# Patient Record
Sex: MALE | Race: WHITE | Employment: FULL TIME | URBAN - METROPOLITAN AREA
[De-identification: names, ages, dates, MRNs, and addresses within clinical notes are randomized per-mention and may not be internally consistent; named-entity substitution may affect disease eponyms.]

---

## 2017-09-13 ENCOUNTER — OFFICE VISIT (OUTPATIENT)
Dept: FAMILY MEDICINE | Facility: CLINIC | Age: 32
End: 2017-09-13
Payer: COMMERCIAL

## 2017-09-13 VITALS
OXYGEN SATURATION: 97 % | BODY MASS INDEX: 25.71 KG/M2 | HEIGHT: 72 IN | WEIGHT: 189.8 LBS | DIASTOLIC BLOOD PRESSURE: 74 MMHG | HEART RATE: 74 BPM | SYSTOLIC BLOOD PRESSURE: 114 MMHG | TEMPERATURE: 98.1 F

## 2017-09-13 DIAGNOSIS — Z28.21 INFLUENZA VACCINATION DECLINED BY PATIENT: ICD-10-CM

## 2017-09-13 DIAGNOSIS — E66.3 OVERWEIGHT (BMI 25.0-29.9): ICD-10-CM

## 2017-09-13 DIAGNOSIS — J01.00 ACUTE MAXILLARY SINUSITIS, RECURRENCE NOT SPECIFIED: Primary | ICD-10-CM

## 2017-09-13 PROCEDURE — 99213 OFFICE O/P EST LOW 20 MIN: CPT | Performed by: NURSE PRACTITIONER

## 2017-09-13 RX ORDER — BENZONATATE 200 MG/1
200 CAPSULE ORAL 3 TIMES DAILY PRN
Qty: 21 CAPSULE | Refills: 0 | Status: SHIPPED | OUTPATIENT
Start: 2017-09-13 | End: 2018-02-19

## 2017-09-13 NOTE — PATIENT INSTRUCTIONS
Based on your medical history and these are the current health maintenance or preventive care services that you are due for (some may have been done at this visit)  Health Maintenance Due   Topic Date Due     EYE EXAM Q1 YEAR  04/28/2017     INFLUENZA VACCINE (SYSTEM ASSIGNED)  09/01/2017         At [facility], we strive to deliver an exceptional experience to you, every time we see you.    If you receive a survey in the mail, please send us back your thoughts. We really do value your feedback.    Your care team's suggested websites for health information:  Www.[facility].org : Up to date and easily searchable information on multiple topics.  Www.medlineplus.gov : medication info, interactive tutorials, watch real surgeries online  Www.familydoctor.org : good info from the Academy of Family Physicians  Www.cdc.gov : public health info, travel advisories, epidemics (H1N1)  Www.aap.org : children's health info, normal development, vaccinations  Www.health.[facility].[place].us : [place] dept of health, public health issues in [place], N1N1    How to contact your care team:   Team Cherelle/Josh [phone]         Pharmacy [phone]    Dr. Luna, Mallika Vásquez PA-C, Dr. Hatch, Dorothy VAZQUEZ CNP, Catherine Kaba PA-C, Dr. Valerio, and GEORGE Batista CNP    Team RNs: Suzy & Fiorella      Clinic hours  M-Th 7 am-7 pm   Fri 7 am-5 pm.   Urgent care M-F 11 am-9 pm,   Sat/Sun 9 am-5 pm.  Pharmacy M-Th 8 am-8 pm Fri 8 am-6 pm  Sat/Sun 9 am-5 pm.     All password changes, disabled accounts, or ID changes in [employer] will be done by our Access Services Department.    If you need help with your account or password, call: [phone]. Clinic staff no longer has the ability to change passwords.     Sinusitis (No Antibiotics)    The sinuses are air-filled spaces within the bones of the face. They connect to the inside of the nose. Sinusitis is an inflammation of the tissue lining the sinus cavity.  Sinus inflammation can occur during a cold. It can also be due to allergies to pollens and other particles in the air. It can cause symptoms such as sinus congestion, headache, sore throat, facial swelling and fullness. It may also cause a low-grade fever. No infection is present, and no antibiotic treatment is needed.  Home care    Drink plenty of water, hot tea, and other liquids. This may help thin mucus. It also may promote sinus drainage.    Heat may help soothe painful areas of the face. Use a towel soaked in hot water. Or,  the shower and direct the hot spray onto your face. Using a vaporizer along with a menthol rub at night may also help.     An expectorant containing guaifenesin may help thin the mucus and promote drainage from the sinuses.    Over-the-counter decongestants may be used unless a similar medicine was prescribed. Nasal sprays work the fastest. Use one that contains phenylephrine or oxymetazoline. First blow the nose gently. Then use the spray. Do not use these medicines more often than directed on the label or symptoms may get worse. You may also use tablets containing pseudoephedrine. Avoid products that combine ingredients, because side effects may be increased. Read labels. You can also ask the pharmacist for help. (NOTE: Persons with high blood pressure should not use decongestants. They can raise blood pressure.)    Over-the-counter antihistamines may help if allergies contributed to your sinusitis.      Use acetaminophen or ibuprofen to control pain, unless another pain medicine was prescribed. (If you have chronic liver or kidney disease or ever had a stomach ulcer, talk with your doctor before using these medicines. Aspirin should never be used in anyone under 18 years of age who is ill with a fever. It may cause severe liver damage.)    Use nasal rinses or irrigation as instructed by your health care provider.    Don't smoke. This can worsen symptoms.  Follow-up care  Follow  up with your healthcare provider or our staff if you are not improving within the next week.  When to seek medical advice  Call your healthcare provider if any of these occur:    Green or yellow discharge from the nose or into the throat    Facial pain or headache becoming more severe    Stiff neck    Unusual drowsiness or confusion    Swelling of the forehead or eyelids    Vision problems, including blurred or double vision    Fever of 100.4 F (38 C) or higher, or as directed by your healthcare provider    Seizure    Breathing problems    Symptoms not resolving within 10 days  Date Last Reviewed: 4/13/2015 2000-2017 [employer]. [address] [place], [place] [address]. All rights reserved. This information is not intended as a substitute for professional medical care. Always follow your healthcare professional's instructions.

## 2017-09-13 NOTE — NURSING NOTE
"Chief Complaint   Patient presents with     URI       Initial /74 (BP Location: Left arm, Patient Position: Sitting, Cuff Size: Adult Regular)  Pulse 74  Temp 98.1  F (36.7  C) (Oral)  Ht 5' 11.65\" (1.82 m)  Wt 189 lb 12.8 oz (86.1 kg)  SpO2 97%  BMI 25.99 kg/m2 Estimated body mass index is 25.99 kg/(m^2) as calculated from the following:    Height as of this encounter: 5' 11.65\" (1.82 m).    Weight as of this encounter: 189 lb 12.8 oz (86.1 kg).  Medication Reconciliation: complete   Carlotta Khan MA      "

## 2017-09-13 NOTE — PROGRESS NOTES
SUBJECTIVE:   Catarino Leon is a 31 year old male who presents to clinic today for the following health issues:      ENT Symptoms             Symptoms: cc Present Absent Comment   Fever/Chills   x    Fatigue   x    Muscle Aches   x    Eye Irritation   x    Sneezing   x    Nasal Van/Drg  x     Sinus Pressure/Pain  x     Loss of smell  x     Dental pain   x    Sore Throat  x  Started with sore throat for 1 day    Swollen Glands  x     Ear Pain/Fullness   x    Cough  x     Wheeze  x     Chest Pain  x     Shortness of breath  x     Rash   x    Other         Symptom duration:  1 day, sore throat started Monday    Symptom severity:  Mild   Treatments tried:  Nitequil   Contacts:  Yes- friend was sick couple weeks ago      No history of asthma or allergies.      Problem list and histories reviewed & adjusted, as indicated.  Additional history: as documented    Patient Active Problem List   Diagnosis     CARDIOVASCULAR SCREENING; LDL GOAL LESS THAN 160     Sebaceous cyst     History of kidney stones     Overweight (BMI 25.0-29.9)     Past Surgical History:   Procedure Laterality Date     FACIAL RECONSTRUCTION SURGERY      multiple surgeries following a trauma       Social History   Substance Use Topics     Smoking status: Never Smoker     Smokeless tobacco: Never Used     Alcohol use Yes      Comment: occasionally     Family History   Problem Relation Age of Onset     HEART DISEASE Maternal Grandfather      HEART DISEASE Paternal Grandfather      Nephrolithiasis Sister      C.A.D. No family hx of      DIABETES No family hx of      Hypertension No family hx of      CANCER No family hx of      CEREBROVASCULAR DISEASE No family hx of      Thyroid Disease No family hx of      Glaucoma No family hx of      Macular Degeneration No family hx of          BP Readings from Last 3 Encounters:   09/13/17 114/74   10/10/16 114/67   05/19/16 125/66    Wt Readings from Last 3 Encounters:   09/13/17 189 lb 12.8 oz (86.1 kg)  "  10/10/16 191 lb (86.6 kg)   06/09/15 178 lb (80.7 kg)                  Labs reviewed in EPIC        Reviewed and updated as needed this visit by clinical staff       Reviewed and updated as needed this visit by Provider         ROS:  Constitutional, HEENT, cardiovascular, pulmonary, gi and gu systems are negative, except as otherwise noted.      OBJECTIVE:   /74 (BP Location: Left arm, Patient Position: Sitting, Cuff Size: Adult Regular)  Pulse 74  Temp 98.1  F (36.7  C) (Oral)  Ht 5' 11.65\" (1.82 m)  Wt 189 lb 12.8 oz (86.1 kg)  SpO2 97%  BMI 25.99 kg/m2  Body mass index is 25.99 kg/(m^2).  GENERAL: healthy, alert and no distress  EYES: Eyes grossly normal to inspection, PERRL and conjunctivae and sclerae normal  HENT: ear canals and TM's normal, nose and mouth without ulcers or lesions, + maxillary sinus TTP bilaterally  NECK: no adenopathy, no asymmetry, masses, or scars and thyroid normal to palpation  RESP: lungs clear to auscultation - no rales, rhonchi or wheezes  CV: regular rate and rhythm, normal S1 S2, no S3 or S4, no murmur, click or rub, no peripheral edema and peripheral pulses strong  ABDOMEN: soft, nontender, no hepatosplenomegaly, no masses and bowel sounds normal  MS: no gross musculoskeletal defects noted, no edema  SKIN: no suspicious lesions or rashes  BACK: no CVA tenderness, no paralumbar tenderness  PSYCH: mentation appears normal, affect normal/bright  LYMPH: normal ant/post cervical, supraclavicular nodes    Diagnostic Test Results:  none     ASSESSMENT/PLAN:         BMI:   Estimated body mass index is 25.99 kg/(m^2) as calculated from the following:    Height as of this encounter: 5' 11.65\" (1.82 m).    Weight as of this encounter: 189 lb 12.8 oz (86.1 kg).   Weight management plan: Discussed healthy diet and exercise guidelines and patient will follow up in 12 months in clinic to re-evaluate.        1. Acute maxillary sinusitis, recurrence not specified       Antibiotics not " indicated at this time      We discussed the pathophysiology of sinus pressure/sinusitis and treatment options with emphasis on healthy lifestyle and opening up natural drainage passages.   We discussed that in only 0.5% to 2% of the time, sinusitis is complicated by a bacterial infection requiring antibiotics.   Of those patients who have bacterial sinusitis, 40-60% will clear the infection on their own spontaneously.  I discussed the risks and benefits of various over the counter and prescription treatments including short courses of decongestant nasal sprays.  Symptoms will typically resolve after 7-10 days. Catarino Leon is to continue to push fluids, saline lavage, OTC Tylenol or Ibuprofen for headache/sinus pain and return to clinic iIf new, worsening or persistent symptoms.    - benzonatate (TESSALON) 200 MG capsule; Take 1 capsule (200 mg) by mouth 3 times daily as needed for cough  Dispense: 21 capsule; Refill: 0    2. Overweight (BMI 25.0-29.9)  Benefits of weight loss reviewed in detail, encouraged him to cut back on the carbohydrates in the diet, consume more fruits and vegetables, drink plenty of water, avoid fruit juices, sodas, get 150 min moderate exercise/week.  Recheck weight in 6 months.      3. Influenza vaccination declined by patient  Patient will get vaccine from his employer      See Patient Instructions    GEORGE Caceres [facility]

## 2018-02-19 ENCOUNTER — OFFICE VISIT (OUTPATIENT)
Dept: FAMILY MEDICINE | Facility: CLINIC | Age: 33
End: 2018-02-19
Payer: COMMERCIAL

## 2018-02-19 VITALS
BODY MASS INDEX: 25.2 KG/M2 | HEART RATE: 82 BPM | TEMPERATURE: 98.5 F | OXYGEN SATURATION: 97 % | DIASTOLIC BLOOD PRESSURE: 76 MMHG | SYSTOLIC BLOOD PRESSURE: 125 MMHG | WEIGHT: 184 LBS | RESPIRATION RATE: 14 BRPM

## 2018-02-19 DIAGNOSIS — J01.00 ACUTE MAXILLARY SINUSITIS, RECURRENCE NOT SPECIFIED: Primary | ICD-10-CM

## 2018-02-19 LAB
DEPRECATED S PYO AG THROAT QL EIA: NORMAL
FLUAV+FLUBV AG SPEC QL: NEGATIVE
FLUAV+FLUBV AG SPEC QL: NEGATIVE
SPECIMEN SOURCE: NORMAL
SPECIMEN SOURCE: NORMAL

## 2018-02-19 PROCEDURE — 87880 STREP A ASSAY W/OPTIC: CPT | Performed by: NURSE PRACTITIONER

## 2018-02-19 PROCEDURE — 99213 OFFICE O/P EST LOW 20 MIN: CPT | Performed by: NURSE PRACTITIONER

## 2018-02-19 PROCEDURE — 87804 INFLUENZA ASSAY W/OPTIC: CPT | Performed by: NURSE PRACTITIONER

## 2018-02-19 PROCEDURE — 87081 CULTURE SCREEN ONLY: CPT | Performed by: NURSE PRACTITIONER

## 2018-02-19 ASSESSMENT — PAIN SCALES - GENERAL: PAINLEVEL: NO PAIN (0)

## 2018-02-19 NOTE — PROGRESS NOTES
SUBJECTIVE:   Catarino Leon is a 32 year old male who presents to clinic today for the following health issues:    Acute Illness   Acute illness concerns:  URI  Onset: 3 days     Fever: YES    Chills/Sweats: YES    Headache (location?): YES    Sinus Pressure:YES    Conjunctivitis:  no    Ear Pain: no    Rhinorrhea: YES    Congestion: YES    Sore Throat: no      Cough: YES-productive of yellow sputum    Wheeze: YES    Decreased Appetite: YES    Nausea: no    Vomiting: no    Diarrhea:  no    Dysuria/Freq.: no    Fatigue/Achiness: YES    Sick/Strep Exposure: YES     Therapies Tried and outcome: Dayquill     32 year old male presents with the above concerns. Worst symptoms are head congestion. Started in chest 1 week ago and moved to head 3 days ago. Very tired. Feverish last night and this morning. Family sick but they're improving. Taking Dayquil and Nyquil. Feels like sinus infection. No chest pain, shortness of breath. No nausea, vomiting. Feverish last day or so - hasn't checked temp.    Problem list and histories reviewed & adjusted, as indicated.  Additional history: as documented    Patient Active Problem List   Diagnosis     CARDIOVASCULAR SCREENING; LDL GOAL LESS THAN 160     Sebaceous cyst     History of kidney stones     Overweight (BMI 25.0-29.9)     Past Surgical History:   Procedure Laterality Date     FACIAL RECONSTRUCTION SURGERY      multiple surgeries following a trauma       Social History   Substance Use Topics     Smoking status: Never Smoker     Smokeless tobacco: Never Used     Alcohol use Yes      Comment: occasionally     Family History   Problem Relation Age of Onset     HEART DISEASE Maternal Grandfather      HEART DISEASE Paternal Grandfather      Nephrolithiasis Sister      C.A.D. No family hx of      DIABETES No family hx of      Hypertension No family hx of      CANCER No family hx of      CEREBROVASCULAR DISEASE No family hx of      Thyroid Disease No family hx of      Glaucoma  No family hx of      Macular Degeneration No family hx of          Current Outpatient Prescriptions   Medication Sig Dispense Refill     amoxicillin-clavulanate (AUGMENTIN) 875-125 MG per tablet Take 1 tablet by mouth 2 times daily 20 tablet 0     No Known Allergies    Reviewed and updated as needed this visit by clinical staff  Tobacco  Allergies  Meds  Problems       Reviewed and updated as needed this visit by Provider  Allergies  Meds  Problems         ROS:  Constitutional, HEENT, cardiovascular, pulmonary, gi and gu systems are negative, except as otherwise noted.    OBJECTIVE:     /76 (BP Location: Left arm, Patient Position: Chair, Cuff Size: Adult Large)  Pulse 82  Temp 98.5  F (36.9  C) (Oral)  Resp 14  Wt 184 lb (83.5 kg)  SpO2 97%  BMI 25.2 kg/m2  Body mass index is 25.2 kg/(m^2).  GENERAL: healthy, alert and no distress. Appears tired.  EYES: Eyes grossly normal to inspection, PERRL and conjunctivae and sclerae normal  HENT: ear canals and TM's normal, nose and mouth without ulcers or lesions. Fluid behind both TMs -  No erythema.  NECK: no adenopathy, no asymmetry, masses, or scars and thyroid normal to palpation  RESP: lungs clear to auscultation - no rales, rhonchi or wheezes  CV: regular rate and rhythm, normal S1 S2, no S3 or S4, no murmur, click or rub, no peripheral edema and peripheral pulses strong  MS: no gross musculoskeletal defects noted, no edema  PSYCH: mentation appears normal, affect normal/bright    Diagnostic Test Results:  Results for orders placed or performed in visit on 02/19/18 (from the past 24 hour(s))   Strep, Rapid Screen   Result Value Ref Range    Specimen Description Throat     Rapid Strep A Screen       NEGATIVE: No Group A streptococcal antigen detected by immunoassay, await culture report.   Influenza A/B antigen   Result Value Ref Range    Influenza A/B Agn Specimen Nasal     Influenza A Negative NEG^Negative    Influenza B Negative NEG^Negative        ASSESSMENT/PLAN:     1. Acute maxillary sinusitis, recurrence not specified  Negative influenza and strep. Explained this still could be viral but given the course of better then worse, will start Augmentin.  - Influenza A/B antigen  - Strep, Rapid Screen  - Beta strep group A culture  - amoxicillin-clavulanate (AUGMENTIN) 875-125 MG per tablet; Take 1 tablet by mouth 2 times daily  Dispense: 20 tablet; Refill: 0  Push fluids, rest  Saline nasal irrigation or can try a intranasal decongestant such as Afrin but not more than three days. Can also try Flonase 2 sprays each nostril daily x2 weeks   Can try Mucinex DM for chest congestion  Start antibiotic today  Tylenol/ibuprofen as needed for pain/fever  If worsening or not improving, follow up with primary care provider in 5 days, sooner if needed  See Patient Instructions    Patient verbalizes understanding and agrees with plan of care. Patient stable for discharge.      GEORGE Oseguera CNP  [facility]

## 2018-02-19 NOTE — MR AVS SNAPSHOT
After Visit Summary   2/19/2018    Catarino Leon    MRN: [id number]           Patient Information     Date Of Birth          1985        Visit Information        Provider Department      2/19/2018 12:00 PM Celia Garner APRN [facility]        Today's Diagnoses     Acute maxillary sinusitis, recurrence not specified    -  1      Care Instructions    Push fluids, rest  Saline nasal irrigation or can try a intranasal decongestant such as Afrin but not more than three days. Can also try Flonase 2 sprays each nostril daily x2 weeks   Can try Mucinex DM for chest congestion  Start antibiotic today  Tylenol/ibuprofen as needed for pain/fever  If worsening or not improving, follow up with primary care provider in 5 days, sooner if needed      Acute Sinusitis    Acute sinusitis is irritation and swelling of the sinuses. It is usually caused by a viral infection after a common cold. Your doctor can help you find relief.  What is acute sinusitis?  Sinuses are air-filled spaces in the skull behind the face. They are kept moist and clean by a lining of mucosa. Things such as pollen, smoke, and chemical fumes can irritate the mucosa. It can then swell up. As a response to irritation, the mucosa makes more mucus and other fluids. Tiny hairlike cilia cover the mucosa. Cilia help carry mucus toward the opening of the sinus. Too much mucus may cause the cilia to stop working. This blocks the sinus opening. A buildup of fluid in the sinuses then causes pain and pressure. It can also encourage bacteria to grow in the sinuses.  Common symptoms of acute sinusitis  You may have:    Facial soreness pain    Headache    Fever    Fluid draining in the back of the throat (postnasal drip)    Congestion    Drainage that is thick and colored, instead of clear    Cough  Diagnosing acute sinusitis  Your doctor will ask about your symptoms and health history. He or she will look at your ear,  nose, and throat. You usually won't need to have X-rays taken.    The doctor may take a sample of mucus to check for bacteria. If you have sinusitis that keeps coming back, you may need imaging tests such as X-rays or CAT scans. This will help your doctor check for a structural problem that may be causing the infection.  Treating acute sinusitis  Treatment is aimed at unblocking the sinus opening and helping the cilia work again. You may need to take antihistamine and decongestant medicine. These can reduce inflammation and decrease the amount of fluid your sinuses make. If you have a bacterial infection, you will need to take antibiotic medicine for 10 to 14 days. Take this medicine until it is gone, even if you feel better.  Date Last Reviewed: 10/1/2016    0187-0011 [employer]. [address] [place] [address], [place] [id number]. All rights reserved. This information is not intended as a substitute for professional medical care. Always follow your healthcare professional's instructions.                Follow-ups after your visit        Who to contact     If you have questions or need follow up information about today's clinic visit or your schedule please contact [facility] directly at [phone].  Normal or non-critical lab and imaging results will be communicated to you by MyChart, letter or phone within 4 business days after the clinic has received the results. If you do not hear from us within 7 days, please contact the clinic through [facility] or phone. If you have a critical or abnormal lab result, we will notify you by phone as soon as possible.  Submit refill requests through [facility] or call your pharmacy and they will forward the refill request to us. Please allow 3 business days for your refill to be completed.          Additional Information About Your Visit        [facility] Information     [facility] lets you send messages to your doctor, view your test results, renew your  "prescriptions, schedule appointments and more. To sign up, go to [email or web address] . Click on \"Log in\" on the left side of the screen, which will take you to the Welcome page. Then click on \"Sign up Now\" on the right side of the page.     You will be asked to enter the access code listed below, as well as some personal information. Please follow the directions to create your username and password.     Your access code is: [email or web address]  Expires: 2018  1:36 PM     Your access code will  in 90 days. If you need help or a new code, please call your [facility] clinic or [phone].        Care EveryWhere ID     This is your Care EveryWhere ID. This could be used by other organizations to access your [facility] medical records  [id number]        Your Vitals Were     Pulse Temperature Respirations Pulse Oximetry BMI (Body Mass Index)       82 98.5  F (36.9  C) (Oral) 14 97% 25.2 kg/m2        Blood Pressure from Last 3 Encounters:   18 125/76   17 114/74   10/10/16 114/67    Weight from Last 3 Encounters:   18 184 lb (83.5 kg)   17 189 lb 12.8 oz (86.1 kg)   10/10/16 191 lb (86.6 kg)              We Performed the Following     Beta strep group A culture     Influenza A/B antigen     Strep, Rapid Screen          Today's Medication Changes          These changes are accurate as of 18  1:36 PM.  If you have any questions, ask your nurse or doctor.               Start taking these medicines.        Dose/Directions    amoxicillin-clavulanate 875-125 MG per tablet   Commonly known as:  AUGMENTIN   Used for:  Acute maxillary sinusitis, recurrence not specified   Started by:  Celia Garner APRN CNP        Dose:  1 tablet   Take 1 tablet by mouth 2 times daily   Quantity:  20 tablet   Refills:  0            Where to get your medicines      These medications were sent to [facility] [place] - [address]  [address], [place] [address] "     Phone:  [phone]     amoxicillin-clavulanate 875-125 MG per tablet                Primary Care Provider Office Phone # Fax #    [facility] [phone]       [address]  [facility] [address]        Equal Access to Services     SULEMAN DEWITT : Hadii aad ku hadasho Soomaali, waaxda luqadaha, qaybta kaalmada adeegyada, waxay idiin hayaan adeeg khraulitosh laanisa alfonso. So [facility] [phone].    ATENCIÓN: Si habla español, tiene a castillo disposición servicios gratuitos de asistencia lingüística. Llame al [phone].    We comply with applicable federal civil rights laws and [place] laws. We do not discriminate on the basis of race, color, national origin, age, disability, sex, sexual orientation, or gender identity.            Thank you!     Thank you for choosing [facility]  for your care. Our goal is always to provide you with excellent care. Hearing back from our patients is one way we can continue to improve our services. Please take a few minutes to complete the written survey that you may receive in the mail after your visit with us. Thank you!             Your Updated Medication List - Protect others around you: Learn how to safely use, store and throw away your medicines at www.disposemymeds.org.          This list is accurate as of 2/19/18  1:36 PM.  Always use your most recent med list.                   Brand Name Dispense Instructions for use Diagnosis    amoxicillin-clavulanate 875-125 MG per tablet    AUGMENTIN    20 tablet    Take 1 tablet by mouth 2 times daily    Acute maxillary sinusitis, recurrence not specified

## 2018-02-19 NOTE — PATIENT INSTRUCTIONS
Push fluids, rest  Saline nasal irrigation or can try a intranasal decongestant such as Afrin but not more than three days. Can also try Flonase 2 sprays each nostril daily x2 weeks   Can try Mucinex DM for chest congestion  Start antibiotic today  Tylenol/ibuprofen as needed for pain/fever  If worsening or not improving, follow up with primary care provider in 5 days, sooner if needed      Acute Sinusitis    Acute sinusitis is irritation and swelling of the sinuses. It is usually caused by a viral infection after a common cold. Your doctor can help you find relief.  What is acute sinusitis?  Sinuses are air-filled spaces in the skull behind the face. They are kept moist and clean by a lining of mucosa. Things such as pollen, smoke, and chemical fumes can irritate the mucosa. It can then swell up. As a response to irritation, the mucosa makes more mucus and other fluids. Tiny hairlike cilia cover the mucosa. Cilia help carry mucus toward the opening of the sinus. Too much mucus may cause the cilia to stop working. This blocks the sinus opening. A buildup of fluid in the sinuses then causes pain and pressure. It can also encourage bacteria to grow in the sinuses.  Common symptoms of acute sinusitis  You may have:    Facial soreness pain    Headache    Fever    Fluid draining in the back of the throat (postnasal drip)    Congestion    Drainage that is thick and colored, instead of clear    Cough  Diagnosing acute sinusitis  Your doctor will ask about your symptoms and health history. He or she will look at your ear, nose, and throat. You usually won't need to have X-rays taken.    The doctor may take a sample of mucus to check for bacteria. If you have sinusitis that keeps coming back, you may need imaging tests such as X-rays or CAT scans. This will help your doctor check for a structural problem that may be causing the infection.  Treating acute sinusitis  Treatment is aimed at unblocking the sinus opening and  helping the cilia work again. You may need to take antihistamine and decongestant medicine. These can reduce inflammation and decrease the amount of fluid your sinuses make. If you have a bacterial infection, you will need to take antibiotic medicine for 10 to 14 days. Take this medicine until it is gone, even if you feel better.  Date Last Reviewed: 10/1/2016    2407-5335 [employer]. [address] [place] [address], [place] [address]. All rights reserved. This information is not intended as a substitute for professional medical care. Always follow your healthcare professional's instructions.

## 2018-02-20 LAB
BACTERIA SPEC CULT: NORMAL
SPECIMEN SOURCE: NORMAL

## 2018-06-26 ENCOUNTER — RADIANT APPOINTMENT (OUTPATIENT)
Dept: GENERAL RADIOLOGY | Facility: CLINIC | Age: 33
End: 2018-06-26
Attending: FAMILY MEDICINE
Payer: COMMERCIAL

## 2018-06-26 ENCOUNTER — OFFICE VISIT (OUTPATIENT)
Dept: FAMILY MEDICINE | Facility: CLINIC | Age: 33
End: 2018-06-26
Payer: COMMERCIAL

## 2018-06-26 VITALS
DIASTOLIC BLOOD PRESSURE: 64 MMHG | RESPIRATION RATE: 18 BRPM | BODY MASS INDEX: 25.6 KG/M2 | HEIGHT: 72 IN | SYSTOLIC BLOOD PRESSURE: 138 MMHG | WEIGHT: 189 LBS | OXYGEN SATURATION: 97 % | HEART RATE: 60 BPM | TEMPERATURE: 98.4 F

## 2018-06-26 DIAGNOSIS — S60.132A CONTUSION OF LEFT MIDDLE FINGER WITH DAMAGE TO NAIL, INITIAL ENCOUNTER: ICD-10-CM

## 2018-06-26 DIAGNOSIS — S60.132A CONTUSION OF LEFT MIDDLE FINGER WITH DAMAGE TO NAIL, INITIAL ENCOUNTER: Primary | ICD-10-CM

## 2018-06-26 PROCEDURE — 73140 X-RAY EXAM OF FINGER(S): CPT | Mod: LT

## 2018-06-26 PROCEDURE — 99213 OFFICE O/P EST LOW 20 MIN: CPT | Performed by: FAMILY MEDICINE

## 2018-06-26 ASSESSMENT — PAIN SCALES - GENERAL: PAINLEVEL: SEVERE PAIN (7)

## 2018-06-26 NOTE — PATIENT INSTRUCTIONS
At [facility], we strive to deliver an exceptional experience to you, every time we see you.  If you receive a survey in the mail, please send us back your thoughts. We really do value your feedback.    Based on your medical history, these are the current health maintenance/preventive care services that you are due for (some may have been done at this visit.)  Health Maintenance Due   Topic Date Due     HIV SCREEN (SYSTEM ASSIGNED)  10/10/2002     EYE EXAM Q1 YEAR  04/28/2017     PHQ-2 Q1 YR  10/10/2017         Suggested websites for health information:  Www."[employer].Barcoding : Up to date and easily searchable information on multiple topics.  Www.stiQRd.gov : medication info, interactive tutorials, watch real surgeries online  Www.familydoctor.org : good info from the Academy of Family Physicians  Www.cdc.gov : public health info, travel advisories, epidemics (H1N1)  Www.aap.org : children's health info, normal development, vaccinations  Www.health.[facility].[place].us : [place] dept of health, public health issues in [place], N1N1    Your care team:                            Family Medicine Internal Medicine   MD Adeel Pate MD Shantel Branch-Fleming, MD Katya Georgiev PA-C Nam Ho, MD Pediatrics   ASAD Harrison, SERGE Ordaz APRN CNP   MD Yolis Allen MD Deborah Mielke, MD Kim Thein, APRN [facility]      Clinic hours: Monday - Thursday 7 am-7 pm; Fridays 7 am-5 pm.   Urgent care: Monday - Friday 11 am-9 pm; Saturday and Sunday 9 am-5 pm.  Pharmacy : Monday -Thursday 8 am-8 pm; Friday 8 am-6 pm; Saturday and Sunday 9 am-5 pm.     Clinic: [phone]   Pharmacy: [phone]    Finger Contusion  You have a contusion. This is also called a bruise. There is swelling and some bleeding under the skin, but no broken bones. This injury generally takes a few days to a few weeks to heal. During that time, the bruise will typically change in color  from reddish, to purple-blue, to greenish-yellow, then to yellow-brown.  A finger contusion may be treated with a splint or santiago tape (taping the injured finger to the one next to it for support). Minor contusions likely will need no other treatment.  Home care    Elevate the hand to reduce pain and swelling. As much as possible, sit or lie down with the hand raised about the level of your heart. This is especially important during the first 48 hours.    Ice the finger to help reduce pain and swelling. Wrap a cold source (ice pack or ice cubes in a plastic bag) in a thin towel. Apply to the bruised finger for 20 minutes every 1 to 2 hours the first day. Continue this 3 to 4 times a day until the pain and swelling goes away.    If santiago tape was applied and it becomes wet or dirty, change it. You may replace it with paper, plastic, or cloth tape. Before taping, put a thin strip of cotton or gauze between the fingers to absorb sweat. This will help prevent any breakdown of skin or fungal infections.     Unless another medicine was prescribed, you can take acetaminophen, ibuprofen, or naproxen to control pain. (If you have chronic liver or kidney disease or ever had a stomach ulcer or gastrointestinal bleeding, talk with your doctor before using these medicines.)  Follow up  Follow up with your healthcare provider, or as advised. Call if you are not improving within 1 to 2 weeks.  When to seek medical advice   Call your healthcare provider right away if you have any of the following:    Increased pain or swelling    Hand or arm becomes cold, blue, numb or tingly    Signs of infection: Warmth, drainage, or increased redness or pain around the bruise    Inability to move the injured finger or hand     Frequent bruising for unknown reasons  Date Last Reviewed: 5/1/2017 2000-2017 [employer]. [address] [place] [address], [place] [address]. All rights reserved. This information is not intended as a substitute  for professional medical care. Always follow your healthcare professional's instructions.

## 2018-06-26 NOTE — PROGRESS NOTES
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 Pain is throbbing.    Intensity: severe, 7/10    Progression of Symptoms:  same    Accompanying Signs & Symptoms:  Pressure, bruise, numbness, stiffness, swelling    Previous history of similar problem:   none    Precipitating factors:   Worsened by: Moving finger    Alleviating factors:  Improved by: Resting    Therapies Tried and outcome: Ice water made it worse        Problem list and histories reviewed & adjusted, as indicated.  Additional history: as documented    Patient Active Problem List   Diagnosis     CARDIOVASCULAR SCREENING; LDL GOAL LESS THAN 160     Sebaceous cyst     History of kidney stones     Overweight (BMI 25.0-29.9)     Past Surgical History:   Procedure Laterality Date     FACIAL RECONSTRUCTION SURGERY      multiple surgeries following a trauma       Social History   Substance Use Topics     Smoking status: Never Smoker     Smokeless tobacco: Never Used     Alcohol use Yes      Comment: occasionally     Family History   Problem Relation Age of Onset     HEART DISEASE Maternal Grandfather      HEART DISEASE Paternal Grandfather      Nephrolithiasis Sister      C.A.D. No family hx of      Diabetes No family hx of      Hypertension No family hx of      Cancer No family hx of      Cerebrovascular Disease No family hx of      Thyroid Disease No family hx of      Glaucoma No family hx of      Macular Degeneration No family hx of          No current outpatient prescriptions on file.     No Known Allergies  Recent Labs   Lab Test  08/06/13   0800  01/10/11   2050   ALT  43   --    CR  0.91  0.80   GFRESTIMATED  >90  >90   GFRESTBLACK  >90  >90   POTASSIUM  3.7  3.7      BP Readings from Last 3  "Encounters:   06/26/18 138/64   02/19/18 125/76   09/13/17 114/74    Wt Readings from Last 3 Encounters:   06/26/18 189 lb (85.7 kg)   02/19/18 184 lb (83.5 kg)   09/13/17 189 lb 12.8 oz (86.1 kg)                  Labs reviewed in EPIC    Reviewed and updated as needed this visit by clinical staff  Tobacco  Allergies  Meds  Med Hx  Surg Hx  Fam Hx  Soc Hx      Reviewed and updated as needed this visit by Provider         ROS:  Constitutional, HEENT, cardiovascular, pulmonary, gi and gu systems are negative, except as otherwise noted.    OBJECTIVE:     /64 (BP Location: Right arm, Patient Position: Chair, Cuff Size: Adult Regular)  Pulse 60  Temp 98.4  F (36.9  C) (Oral)  Resp 18  Ht 5' 11.65\" (1.82 m)  Wt 189 lb (85.7 kg)  SpO2 97%  BMI 25.88 kg/m2  Body mass index is 25.88 kg/(m^2).  GENERAL: healthy, alert and no distress  EYES: Eyes grossly normal to inspection, conjunctivae and sclerae normal  MS: no gross musculoskeletal defects noted, no edema, left middle finger swollen from DIP joint to tip with ecchymosis of base of nail without break in skin. Tenderness of fingertip.   SKIN: no suspicious lesions or rashes  NEURO: Normal strength and tone, gait and speech normal  PSYCH: mentation appears normal, affect normal/bright     Diagnostic Test Results:    No fracture on x ray of finger tip.    No results found for this or any previous visit (from the past 24 hour(s)).    ASSESSMENT/PLAN:               ICD-10-CM    1. Contusion of left middle finger with damage to nail, initial encounter S60.132A XR Finger Left G/E 2 Views no fracture seen. Discussed care of injured finger and take ibuprofen for pain. Elevate as needed. Finger protector given for work and letter.        FUTURE APPOINTMENTS:       - Follow-up for annual visit or as needed  See Patient Instructions    Kandy Renteria MD  [facility]    "

## 2018-06-26 NOTE — MR AVS SNAPSHOT
After Visit Summary   6/26/2018    Catarino Leon    MRN: [id number]           Patient Information     Date Of Birth          1985        Visit Information        Provider Department      6/26/2018 5:20 PM Kandy Renteria MD [facility]        Today's Diagnoses     Contusion of left middle finger with damage to nail, initial encounter    -  1      Care Instructions    At [facility], we strive to deliver an exceptional experience to you, every time we see you.  If you receive a survey in the mail, please send us back your thoughts. We really do value your feedback.    Based on your medical history, these are the current health maintenance/preventive care services that you are due for (some may have been done at this visit.)  Health Maintenance Due   Topic Date Due     HIV SCREEN (SYSTEM ASSIGNED)  10/10/2002     EYE EXAM Q1 YEAR  04/28/2017     PHQ-2 Q1 YR  10/10/2017         Suggested websites for health information:  Www.[employer] : Up to date and easily searchable information on multiple topics.  Www.medlineplus.gov : medication info, interactive tutorials, watch real surgeries online  Www.familydoctor.org : good info from the Academy of Family Physicians  Www.cdc.gov : public health info, travel advisories, epidemics (H1N1)  Www.aap.org : children's health info, normal development, vaccinations  Www.health.state[place]us : [place] dept of health, public health issues in [place], N1N1    Your care team:                            Family Medicine Internal Medicine   MD Adeel Pate MD Shantel Branch-Fleming, MD Katya Georgiev PA-C Nam Ho, MD Pediatrics   ASAD Harrison, MD Yolis Lorenzo CNP, MD Deborah Mielke, MD Kim Thein, APRN CNP      Clinic hours: Monday - Thursday 7 am-7 pm; Fridays 7 am-5 pm.   Urgent care: Monday - Friday 11 am-9 pm; Saturday and Sunday 9  am-5 pm.  Pharmacy : Monday -Thursday 8 am-8 pm; Friday 8 am-6 pm; Saturday and Sunday 9 am-5 pm.     Clinic: [phone]   Pharmacy: [phone]    Finger Contusion  You have a contusion. This is also called a bruise. There is swelling and some bleeding under the skin, but no broken bones. This injury generally takes a few days to a few weeks to heal. During that time, the bruise will typically change in color from reddish, to purple-blue, to greenish-yellow, then to yellow-brown.  A finger contusion may be treated with a splint or santiago tape (taping the injured finger to the one next to it for support). Minor contusions likely will need no other treatment.  Home care    Elevate the hand to reduce pain and swelling. As much as possible, sit or lie down with the hand raised about the level of your heart. This is especially important during the first 48 hours.    Ice the finger to help reduce pain and swelling. Wrap a cold source (ice pack or ice cubes in a plastic bag) in a thin towel. Apply to the bruised finger for 20 minutes every 1 to 2 hours the first day. Continue this 3 to 4 times a day until the pain and swelling goes away.    If santiago tape was applied and it becomes wet or dirty, change it. You may replace it with paper, plastic, or cloth tape. Before taping, put a thin strip of cotton or gauze between the fingers to absorb sweat. This will help prevent any breakdown of skin or fungal infections.     Unless another medicine was prescribed, you can take acetaminophen, ibuprofen, or naproxen to control pain. (If you have chronic liver or kidney disease or ever had a stomach ulcer or gastrointestinal bleeding, talk with your doctor before using these medicines.)  Follow up  Follow up with your healthcare provider, or as advised. Call if you are not improving within 1 to 2 weeks.  When to seek medical advice   Call your healthcare provider right away if you have any of the following:    Increased pain or  "swelling    Hand or arm becomes cold, blue, numb or tingly    Signs of infection: Warmth, drainage, or increased redness or pain around the bruise    Inability to move the injured finger or hand     Frequent bruising for unknown reasons  Date Last Reviewed: 2017-2017 [employer]. [address] [place] [address]. All rights reserved. This information is not intended as a substitute for professional medical care. Always follow your healthcare professional's instructions.                Follow-ups after your visit        Who to contact     If you have questions or need follow up information about today's clinic visit or your schedule please contact [facility] directly at [phone].  Normal or non-critical lab and imaging results will be communicated to you by [facility], letter or phone within 4 business days after the clinic has received the results. If you do not hear from us within 7 days, please contact the clinic through [facility] or phone. If you have a critical or abnormal lab result, we will notify you by phone as soon as possible.  Submit refill requests through [employer] or call your pharmacy and they will forward the refill request to us. Please allow 3 business days for your refill to be completed.          Additional Information About Your Visit        [facility] Information     [employer] lets you send messages to your doctor, view your test results, renew your prescriptions, schedule appointments and more. To sign up, go to [email or web address] . Click on \"Log in\" on the left side of the screen, which will take you to the Welcome page. Then click on \"Sign up Now\" on the right side of the page.     You will be asked to enter the access code listed below, as well as some personal information. Please follow the directions to create your username and password.     Your access code is: [email or web address]  Expires: 2018  6:17 PM     Your access code will  " "in 90 days. If you need help or a new code, please call your [facility] or [phone].        Care EveryWhere ID     This is your Care EveryWhere ID. This could be used by other organizations to access your [facility] medical records  [id number]        Your Vitals Were     Pulse Temperature Respirations Height Pulse Oximetry BMI (Body Mass Index)    60 98.4  F (36.9  C) (Oral) 18 5' 11.65\" (1.82 m) 97% 25.88 kg/m2       Blood Pressure from Last 3 Encounters:   06/26/18 138/64   02/19/18 125/76   09/13/17 114/74    Weight from Last 3 Encounters:   06/26/18 189 lb (85.7 kg)   02/19/18 184 lb (83.5 kg)   09/13/17 189 lb 12.8 oz (86.1 kg)               Primary Care Provider Fax #    Physician No Ref-Primary [phone]       No address on file        Equal Access to Services     SULEMAN DEWITT : Hadii aditya marmolejo hadasho Soomaali, waaxda luqadaha, qaybta kaalmada adeegyada, cheng lowry . So [facility] [phone].    ATENCIÓN: Si habla español, tiene a castillo disposición servicios gratuitos de asistencia lingüística. Llame al [phone].    We comply with applicable federal civil rights laws and [place] laws. We do not discriminate on the basis of race, color, national origin, age, disability, sex, sexual orientation, or gender identity.            Thank you!     Thank you for choosing [facility]  for your care. Our goal is always to provide you with excellent care. Hearing back from our patients is one way we can continue to improve our services. Please take a few minutes to complete the written survey that you may receive in the mail after your visit with us. Thank you!             Your Updated Medication List - Protect others around you: Learn how to safely use, store and throw away your medicines at www.disposemymeds.org.      Notice  As of 6/26/2018  6:17 PM    You have not been prescribed any medications.      "

## 2018-10-04 ENCOUNTER — OFFICE VISIT (OUTPATIENT)
Dept: FAMILY MEDICINE | Facility: CLINIC | Age: 33
End: 2018-10-04
Payer: COMMERCIAL

## 2018-10-04 VITALS
DIASTOLIC BLOOD PRESSURE: 75 MMHG | BODY MASS INDEX: 25.39 KG/M2 | SYSTOLIC BLOOD PRESSURE: 100 MMHG | HEART RATE: 67 BPM | OXYGEN SATURATION: 98 % | TEMPERATURE: 97.9 F | RESPIRATION RATE: 16 BRPM | WEIGHT: 185.4 LBS

## 2018-10-04 DIAGNOSIS — H66.91 RIGHT ACUTE OTITIS MEDIA: Primary | ICD-10-CM

## 2018-10-04 DIAGNOSIS — J01.10 ACUTE NON-RECURRENT FRONTAL SINUSITIS: ICD-10-CM

## 2018-10-04 DIAGNOSIS — R51.9 NONINTRACTABLE EPISODIC HEADACHE, UNSPECIFIED HEADACHE TYPE: ICD-10-CM

## 2018-10-04 PROCEDURE — 99214 OFFICE O/P EST MOD 30 MIN: CPT | Performed by: NURSE PRACTITIONER

## 2018-10-04 ASSESSMENT — PAIN SCALES - GENERAL: PAINLEVEL: NO PAIN (0)

## 2018-10-04 NOTE — PROGRESS NOTES
SUBJECTIVE:   Catarino Leon is a 32 year old male who presents to clinic today for the following health issues:    Acute Illness   Acute illness concerns: fever, ear pain  Onset: x 3 days    Fever: YES; last antipyretic was 9 hours ago; fever subjective x 3 days    Chills/Sweats: YES    Headache (location?): YES    Sinus Pressure:no    Conjunctivitis:  no    Ear Pain: YES: right    Rhinorrhea: no    Congestion: no    Sore Throat: no      Cough: YES, nonproductive    Wheeze: no     Decreased Appetite: YES    Nausea: YES    Vomiting: no    Diarrhea:  no    Dysuria/Freq.: no    Fatigue/Achiness: no    Sick/Strep Exposure: YES     Therapies Tried and outcome: ibuprofen.    Had root canal on left lower side yesterday  Started getting symptoms of a cold on Monday. Cough started Monday and feels hot/cold flashes  Severe headache all over front to back of neck with ache in back of head to neck. Does complain of some sinus pressure.  Went to chiropractor this morning to see if neck stiffness and ache could be relieved but it did not help as much as it usually does.  Reports that ear is plugged and does not have actual ear pain.   Ibuprofen knocked edge off head/neck pain and reduced headache.   Nausea on/off without vomiting  Had abdominal pain yesterday but was only for a brief amount of time and has not returned  Has not had a bowel movement since Tuesday and does not have the urge for a BM  Denies dizziness, vision changes, or loss of balance.      Problem list and histories reviewed & adjusted, as indicated.  Additional history: as documented    Patient Active Problem List   Diagnosis     CARDIOVASCULAR SCREENING; LDL GOAL LESS THAN 160     Sebaceous cyst     History of kidney stones     Overweight (BMI 25.0-29.9)     Past Surgical History:   Procedure Laterality Date     FACIAL RECONSTRUCTION SURGERY      multiple surgeries following a trauma       Social History   Substance Use Topics     Smoking status: Never  Smoker     Smokeless tobacco: Never Used     Alcohol use Yes      Comment: occasionally     Family History   Problem Relation Age of Onset     HEART DISEASE Maternal Grandfather      HEART DISEASE Paternal Grandfather      Nephrolithiasis Sister      C.A.D. No family hx of      Diabetes No family hx of      Hypertension No family hx of      Cancer No family hx of      Cerebrovascular Disease No family hx of      Thyroid Disease No family hx of      Glaucoma No family hx of      Macular Degeneration No family hx of          Current Outpatient Prescriptions   Medication Sig Dispense Refill     amoxicillin-clavulanate (AUGMENTIN) 875-125 MG per tablet Take 1 tablet by mouth 2 times daily 20 tablet 0     No Known Allergies    Reviewed and updated as needed this visit by clinical staff  Tobacco  Allergies  Meds  Med Hx  Surg Hx  Fam Hx  Soc Hx      Reviewed and updated as needed this visit by Provider  Tobacco  Allergies  Meds  Med Hx  Surg Hx  Fam Hx  Soc Hx        ROS:  Constitutional, HEENT, cardiovascular, pulmonary, gi and gu systems are negative, except as otherwise noted.    OBJECTIVE:     /75 (BP Location: Left arm, Patient Position: Chair, Cuff Size: Adult Large)  Pulse 67  Temp 97.9  F (36.6  C) (Oral)  Resp 16  Wt 185 lb 6.4 oz (84.1 kg)  SpO2 98%  BMI 25.39 kg/m2  Body mass index is 25.39 kg/(m^2).  GENERAL: healthy, alert and no distress  EYES: Eyes grossly normal to inspection, PERRL and conjunctivae and sclerae normal  HENT: normal cephalic/atraumatic, right ear: erythematous and bulging membrane, left ear: clear effusion, nose edematous with yellow nasal discharge and mouth without ulcers or lesions, oral mucous membranes moist and tonsillar erythema without exudates  NECK: bilateral anterior cervical adenopathy, no asymmetry, masses, or scars and thyroid normal to palpation  RESP: lungs clear to auscultation - no rales, rhonchi or wheezes  CV: regular rate and rhythm, normal S1  S2, no S3 or S4, no murmur, click or rub, no peripheral edema and peripheral pulses strong  ABDOMEN: soft, nontender, no hepatosplenomegaly, no masses and bowel sounds normal  MS: no gross musculoskeletal defects noted, no edema  NEURO:  Normal gait, PERRLA, normal strength and tone, speech normal    Diagnostic Test Results:  none     ASSESSMENT/PLAN:     1. Right acute otitis media  Given concomitant sinus pressure, will treat with Augmentin.  - amoxicillin-clavulanate (AUGMENTIN) 875-125 MG per tablet; Take 1 tablet by mouth 2 times daily  Dispense: 20 tablet; Refill: 0    2. Acute non-recurrent frontal sinusitis  - amoxicillin-clavulanate (AUGMENTIN) 875-125 MG per tablet; Take 1 tablet by mouth 2 times daily  Dispense: 20 tablet; Refill: 0     3. Headache, unspecified  Advised ibuprofen or tylenol for headache.  Pt to seek emergency services if neck stiffness/pain does not improve or worsens in the next 12 hours. Pt given instruction and education on signs/sypmtoms of meninginitis.    See Patient Instructions    GEORGE Banks [facility]

## 2018-10-04 NOTE — PATIENT INSTRUCTIONS
With augmentin:  Take with food, take probiotic daily to prevent diarrhea (available at the pharmacy over the counter)    At [facility], we strive to deliver an exceptional experience to you, every time we see you.  If you receive a survey in the mail, please send us back your thoughts. We really do value your feedback.    Your care team:                            Family Medicine Internal Medicine   MD Adeel Pate MD Shantel Branch-Fleming, MD Katya Georgiev PA-C Megan Hill, APRELIJAH Freeman MD Pediatrics   Bennett Parisi, ASAD Rodas, MD Dorothy Hameed APRN CNP   MD Yolis Allen MD Deborah Mielke, MD Kim Thein, APRN [facility]      Clinic hours: Monday - Thursday 7 am-7 pm; Fridays 7 am-5 pm.   Urgent care: Monday - Friday 11 am-9 pm; Saturday and Sunday 9 am-5 pm.  Pharmacy : Monday -Thursday 8 am-8 pm; Friday 8 am-6 pm; Saturday and Sunday 9 am-5 pm.     Clinic: [phone]   Pharmacy: [phone]

## 2018-10-04 NOTE — MR AVS SNAPSHOT
After Visit Summary   10/4/2018    Catarino Leon    MRN: [id number]           Patient Information     Date Of Birth          1985        Visit Information        Provider Department      10/4/2018 2:20 PM Aydee Rodas APRN CNP [facility]        Today's Diagnoses     Right acute otitis media    -  1    Acute non-recurrent frontal sinusitis          Care Instructions    With augmentin:  Take with food, take probiotic daily to prevent diarrhea (available at the pharmacy over the counter)    At [facility], we strive to deliver an exceptional experience to you, every time we see you.  If you receive a survey in the mail, please send us back your thoughts. We really do value your feedback.    Your care team:                            Family Medicine Internal Medicine   MD Adeel Pate MD Shantel Branch-Fleming, MD Katya Georgiev PA-C Megan Hill, APRN CNP Nam Ho, MD Pediatrics   ASAD Harrison, MD Dorothy Hameed APRMD Yolis Bolton CNP, MD Deborah Mielke, MD Kim Thein, GEORGE [facility]      Clinic hours: Monday - Thursday 7 am-7 pm; Fridays 7 am-5 pm.   Urgent care: Monday - Friday 11 am-9 pm; Saturday and Sunday 9 am-5 pm.  Pharmacy : Monday -Thursday 8 am-8 pm; Friday 8 am-6 pm; Saturday and Sunday 9 am-5 pm.     Clinic: [phone]   Pharmacy: [phone]                Follow-ups after your visit        Who to contact     If you have questions or need follow up information about today's clinic visit or your schedule please contact [facility] directly at [phone].  Normal or non-critical lab and imaging results will be communicated to you by MyChart, letter or phone within 4 business days after the clinic has received the results. If you do not hear from us within 7 days, please contact the clinic through MyChart or phone. If  you have a critical or abnormal lab result, we will notify you by phone as soon as possible.  Submit refill requests through [employer] or call your pharmacy and they will forward the refill request to us. Please allow 3 business days for your refill to be completed.          Additional Information About Your Visit        Care EveryWhere ID     This is your Care EveryWhere ID. This could be used by other organizations to access your [place] medical records  [id number]        Your Vitals Were     Pulse Temperature Respirations Pulse Oximetry BMI (Body Mass Index)       67 97.9  F (36.6  C) (Oral) 16 98% 25.39 kg/m2        Blood Pressure from Last 3 Encounters:   10/04/18 100/75   06/26/18 138/64   02/19/18 125/76    Weight from Last 3 Encounters:   10/04/18 185 lb 6.4 oz (84.1 kg)   06/26/18 189 lb (85.7 kg)   02/19/18 184 lb (83.5 kg)              Today, you had the following     No orders found for display         Today's Medication Changes          These changes are accurate as of 10/4/18  3:07 PM.  If you have any questions, ask your nurse or doctor.               Start taking these medicines.        Dose/Directions    amoxicillin-clavulanate 875-125 MG per tablet   Commonly known as:  AUGMENTIN   Used for:  Right acute otitis media, Acute non-recurrent frontal sinusitis   Started by:  Aydee Rodas APRN CNP        Dose:  1 tablet   Take 1 tablet by mouth 2 times daily   Quantity:  20 tablet   Refills:  0            Where to get your medicines      These medications were sent to [facility] - [place], [place] - [address]  [address], [facility] [address]     Phone:  [phone]     amoxicillin-clavulanate 875-125 MG per tablet                Primary Care Provider Fax #    Physician No Ref-Primary [phone]       No address on file        Equal Access to Services     SULEMAN DEWITT AH: Gloria Santiago, yfn mar, qajohnathonta kacheng collier  rehan simpsontera laOscaraan ah. So [facility] [phone].    ATENCIÓN: Si habla thomas, tiene a castillo disposición servicios gratuitos de asistencia lingüística. Lakia al [phone].    We comply with applicable federal civil rights laws and [place] laws. We do not discriminate on the basis of race, color, national origin, age, disability, sex, sexual orientation, or gender identity.            Thank you!     Thank you for choosing [facility]  for your care. Our goal is always to provide you with excellent care. Hearing back from our patients is one way we can continue to improve our services. Please take a few minutes to complete the written survey that you may receive in the mail after your visit with us. Thank you!             Your Updated Medication List - Protect others around you: Learn how to safely use, store and throw away your medicines at www.disposemymeds.org.          This list is accurate as of 10/4/18  3:07 PM.  Always use your most recent med list.                   Brand Name Dispense Instructions for use Diagnosis    amoxicillin-clavulanate 875-125 MG per tablet    AUGMENTIN    20 tablet    Take 1 tablet by mouth 2 times daily    Right acute otitis media, Acute non-recurrent frontal sinusitis

## 2019-06-13 ENCOUNTER — TELEPHONE (OUTPATIENT)
Dept: FAMILY MEDICINE | Facility: CLINIC | Age: 34
End: 2019-06-13

## 2019-06-13 NOTE — TELEPHONE ENCOUNTER
Patient has not been seen in clinic since October 2018 - Rx was never discussed nor is it active on medication list. Patient will need to be seen by a provider for evaluation before anything can be prescribed.     Team, please advise patient to schedule an OV or go to Urgent Care for evaluation and medication request.     Wandy Kruger RN, BSN

## 2019-06-13 NOTE — TELEPHONE ENCOUNTER
Reason for Call:  Other prescription    Detailed comments: Asking for a prescription for motion sickness would like the patch. Per Pharmacist it is called Transderm or generic is Scopalamine. Leaving tomorrow and would like to  tonight. Please call and let him know if and when this can be sent.  Thank you     Phone Number Patient can be reached at: Home number on file [phone] (home)    Best Time: Any    Can we leave a detailed message on this number? YES    Call taken on 6/13/2019 at 2:55 PM by Quentin Thompson

## 2019-06-14 ENCOUNTER — OFFICE VISIT (OUTPATIENT)
Dept: FAMILY MEDICINE | Facility: CLINIC | Age: 34
End: 2019-06-14
Payer: COMMERCIAL

## 2019-06-14 VITALS
OXYGEN SATURATION: 98 % | HEART RATE: 70 BPM | TEMPERATURE: 97.9 F | DIASTOLIC BLOOD PRESSURE: 78 MMHG | RESPIRATION RATE: 16 BRPM | WEIGHT: 185 LBS | HEIGHT: 71 IN | SYSTOLIC BLOOD PRESSURE: 115 MMHG | BODY MASS INDEX: 25.9 KG/M2

## 2019-06-14 DIAGNOSIS — T75.3XXA MOTION SICKNESS, INITIAL ENCOUNTER: Primary | ICD-10-CM

## 2019-06-14 PROCEDURE — 99213 OFFICE O/P EST LOW 20 MIN: CPT | Performed by: NURSE PRACTITIONER

## 2019-06-14 RX ORDER — SCOLOPAMINE TRANSDERMAL SYSTEM 1 MG/1
1 PATCH, EXTENDED RELEASE TRANSDERMAL
Qty: 3 PATCH | Refills: 0 | Status: SHIPPED | OUTPATIENT
Start: 2019-06-14

## 2019-06-14 ASSESSMENT — PAIN SCALES - GENERAL: PAINLEVEL: NO PAIN (0)

## 2019-06-14 ASSESSMENT — MIFFLIN-ST. JEOR: SCORE: 1801.28

## 2019-06-14 NOTE — PATIENT INSTRUCTIONS
At [facility], we strive to deliver an exceptional experience to you, every time we see you.  If you receive a survey in the mail, please send us back your thoughts. We really do value your feedback.    Based on your medical history, these are the current health maintenance/preventive care services that you are due for (some may have been done at this visit.)  Health Maintenance Due   Topic Date Due     PREVENTIVE CARE VISIT  1985     HIV SCREENING  10/10/1999     EYE EXAM  04/28/2017     PHQ-2  01/01/2019         Suggested websites for health information:  Www.[employer].org : Up to date and easily searchable information on multiple topics.  Www.medlineplus.gov : medication info, interactive tutorials, watch real surgeries online  Www.familydoctor.org : good info from the Academy of Family Physicians  Www.cdc.gov : public health info, travel advisories, epidemics (H1N1)  Www.aap.org : children's health info, normal development, vaccinations  Www.health.[facility].[place].us : [place] dept of health, public health issues in [place], N1N1    Your care team:                            Family Medicine Internal Medicine   MD Adeel Pate MD Shantel Branch-Fleming, MD Katya Georgiev PA-C Nam Ho, MD Pediatrics   ASAD Harrison, SERGE Ordaz APRN CNP   MD Yolis Allen MD Deborah Mielke, MD Kim Thein, APRN CNP      Clinic hours: Monday - Thursday 7 am-7 pm; Fridays 7 am-5 pm.   Urgent care: Monday - Friday 11 am-9 pm; Saturday and Sunday 9 am-5 pm.  Pharmacy : Monday -Thursday 8 am-8 pm; Friday 8 am-6 pm; Saturday and Sunday 9 am-5 pm.     Clinic: [phone]   Pharmacy: [phone]

## 2019-06-14 NOTE — PROGRESS NOTES
Subjective     Catarino Leon is a 33 year old male who presents to clinic today for the following health issues:    HPI   Concern - Medication for motion sickness      Description:   Patient would like to request a prescription for motion sickness. Patient is going on a fishing trip to [place].     Used dramamine last year but didn't work as well as he would have liked.    Patient Active Problem List   Diagnosis     CARDIOVASCULAR SCREENING; LDL GOAL LESS THAN 160     Sebaceous cyst     History of kidney stones     Overweight (BMI 25.0-29.9)     Motion sickness, initial encounter     Past Surgical History:   Procedure Laterality Date     FACIAL RECONSTRUCTION SURGERY      multiple surgeries following a trauma       Social History     Tobacco Use     Smoking status: Never Smoker     Smokeless tobacco: Never Used   Substance Use Topics     Alcohol use: Yes     Comment: occasionally     Family History   Problem Relation Age of Onset     Heart Disease Maternal Grandfather      Heart Disease Paternal Grandfather      Nephrolithiasis Sister      C.A.D. No family hx of      Diabetes No family hx of      Hypertension No family hx of      Cancer No family hx of      Cerebrovascular Disease No family hx of      Thyroid Disease No family hx of      Glaucoma No family hx of      Macular Degeneration No family hx of          Current Outpatient Medications   Medication Sig Dispense Refill     scopolamine (TRANSDERM) 1 MG/3DAYS 72 hr patch Place 1 patch onto the skin every 72 hours 3 patch 0     No Known Allergies     Reviewed and updated as needed this visit by Provider  Tobacco  Allergies  Meds  Problems  Med Hx  Surg Hx  Fam Hx         Review of Systems   ROS COMP: Constitutional, HEENT, cardiovascular, pulmonary, gi and gu systems are negative, except as otherwise noted.      Objective    /78 (BP Location: Right arm, Patient Position: Chair, Cuff Size: Adult Regular)   Pulse 70   Temp 97.9  F (36.6  " C) (Oral)   Resp 16   Ht 1.803 m (5' 11\")   Wt 83.9 kg (185 lb)   SpO2 98%   BMI 25.80 kg/m    Body mass index is 25.8 kg/m .  Physical Exam   GENERAL: healthy, alert and no distress  RESP: lungs clear to auscultation - no rales, rhonchi or wheezes  CV: regular rate and rhythm, normal S1 S2, no S3 or S4, no murmur, click or rub, no peripheral edema and peripheral pulses strong  MS: no gross musculoskeletal defects noted, no edema  PSYCH: mentation appears normal, affect normal/bright    Diagnostic Test Results:  none         Assessment & Plan     1. Motion sickness, initial encounter  Discussed can also try seabands and oxana candies as well as the below.  - scopolamine (TRANSDERM) 1 MG/3DAYS 72 hr patch; Place 1 patch onto the skin every 72 hours  Dispense: 3 patch; Refill: 0     BMI:   Estimated body mass index is 25.8 kg/m  as calculated from the following:    Height as of this encounter: 1.803 m (5' 11\").    Weight as of this encounter: 83.9 kg (185 lb).           See Patient Instructions    Return in about 1 week (around 6/21/2019), or if symptoms worsen or fail to improve.     The benefits, risks and potential side effects were discussed in detail. Black box warnings discussed as relevant. All patient questions were answered. The patient was instructed to follow up immediately if any adverse reactions develop.    Return precautions discussed, including when to seek urgent/emergent care.    Patient verbalizes understanding and agrees with plan of care. Patient stable for discharge.      GEORGE Oseguera [facility]        "

## 2021-08-08 ENCOUNTER — OFFICE VISIT (OUTPATIENT)
Dept: URGENT CARE | Facility: URGENT CARE | Age: 36
End: 2021-08-08
Payer: COMMERCIAL

## 2021-08-08 VITALS
RESPIRATION RATE: 14 BRPM | TEMPERATURE: 98.5 F | HEART RATE: 64 BPM | OXYGEN SATURATION: 98 % | SYSTOLIC BLOOD PRESSURE: 120 MMHG | DIASTOLIC BLOOD PRESSURE: 77 MMHG

## 2021-08-08 DIAGNOSIS — S01.81XA LACERATION OF FOREHEAD, INITIAL ENCOUNTER: Primary | ICD-10-CM

## 2021-08-08 DIAGNOSIS — S09.90XA INJURY OF HEAD, INITIAL ENCOUNTER: ICD-10-CM

## 2021-08-08 PROCEDURE — 90715 TDAP VACCINE 7 YRS/> IM: CPT | Performed by: PHYSICIAN ASSISTANT

## 2021-08-08 PROCEDURE — 90471 IMMUNIZATION ADMIN: CPT | Performed by: PHYSICIAN ASSISTANT

## 2021-08-08 PROCEDURE — 12011 RPR F/E/E/N/L/M 2.5 CM/<: CPT | Performed by: PHYSICIAN ASSISTANT

## 2021-08-08 ASSESSMENT — ENCOUNTER SYMPTOMS
FEVER: 0
DYSURIA: 0
NEUROLOGICAL NEGATIVE: 1
ABDOMINAL PAIN: 0
FREQUENCY: 0
CHILLS: 0
SORE THROAT: 0
COUGH: 0
CARDIOVASCULAR NEGATIVE: 1
PALPITATIONS: 0
ALLERGIC/IMMUNOLOGIC NEGATIVE: 1
MYALGIAS: 0
SHORTNESS OF BREATH: 0
DIARRHEA: 0
CONSTITUTIONAL NEGATIVE: 1
WOUND: 1
GASTROINTESTINAL NEGATIVE: 1
RESPIRATORY NEGATIVE: 1
HEMATURIA: 0
MUSCULOSKELETAL NEGATIVE: 1
VOMITING: 0
HEADACHES: 0
NAUSEA: 0
CHEST TIGHTNESS: 0
WHEEZING: 0

## 2021-08-08 NOTE — PATIENT INSTRUCTIONS
Patient Education     Face Laceration: Stitches or Tape  A laceration is a cut through the skin. This will require stitches if it is deep. Minor cuts may be treated with surgical tape.     Home care    Follow all instructions for any prescribed medicines.  ? Your healthcare provider may prescribe an antibiotic. This is to help prevent infection. Take the medicine every day until it's gone even if you feel better or you are told to stop. You should not have any left over.  ? The healthcare provider may prescribe medicines for pain. Take them as directed.    Follow the healthcare provider s instructions on how to care for the cut.    Wash your hands with soap and clean, running water before and after caring for the cut. This helps prevent infection.    If a bandage was applied and it becomes wet or dirty, replace it. Otherwise, leave it in place for the first 24 hours, then change it once a day or as directed.    If stitches were used, clean the wound daily:  ? After removing the bandage, wash the area with soap and water. Use a wet cotton swab to loosen and remove any blood or crust that forms.  ? After cleaning, keep the wound clean and dry. Talk with your healthcare provider before putting any antibiotic ointment on the wound. Reapply a fresh bandage.  ? Remove the bandage to shower as usual after the first 24 hours, but don't soak the area in water (no swimming) until the stitches are removed.    If surgical tape was used, keep the area clean and dry. If it becomes wet, blot it dry with a towel.    Most facial skin wounds heal without problems. But an infection sometimes occurs despite proper treatment. Watch for the signs of infection listed below.    Follow-up care  Follow up with your healthcare provider as advised. Ask your provider how long stitches should remain in place. Be sure to return for removal of the stitches as directed. This is usually within 5 to 7 days. If surgical tape closures were used, you  may remove them yourself when your provider recommends if they have not fallen off on their own.   When to seek medical advice  Call your healthcare provider right away if any of these occur:    Wound bleeding not controlled by direct pressure    Signs of infection. These include increasing pain in the wound, increasing wound redness or swelling, or pus or bad odor coming from the wound.    Fever of 100.4 F (38 C) or higher, chills, or as directed by your healthcare provider    Stitches coming apart or falling out or surgical tape falling off before 5 days    Wound edges reopening    Wound color changes    Numbness around the wound   Luis Fernando last reviewed this educational content on 6/1/2020 2000-2021 [employer]. All rights reserved. This information is not intended as a substitute for professional medical care. Always follow your healthcare professional's instructions.

## 2021-08-08 NOTE — PROGRESS NOTES
Chief Complaint:    No chief complaint on file.        Medical Decision Making:    Vital signs reviewed by Dillon Driver PA-C  There were no vitals taken for this visit.    Differential Diagnosis:  {UC Differential Choices:327401}      ASSESSMENT:     No diagnosis found.       PLAN:     ***  Patient instructed to follow up with PCP in 1 week if symptoms are not improving.  Sooner if symptoms worsen.  Worrisome symptoms discussed with instructions to go to the ED.  Patient verbalized understanding and agreed with this plan.    Labs:     No results found for any visits on 08/08/21.    Current Meds:    Current Outpatient Medications:      scopolamine (TRANSDERM) 1 MG/3DAYS 72 hr patch, Place 1 patch onto the skin every 72 hours, Disp: 3 patch, Rfl: 0    Allergies:  No Known Allergies    SUBJECTIVE    HPI: Catarino Leon is an 35 year old male who presents for evaluation and treatment of lump on his head.  Patient noticed the lump *** ago.      ROS:      Review of Systems   Constitutional: Negative.  Negative for chills and fever.   HENT: Negative.  Negative for sore throat.    Respiratory: Negative.  Negative for cough, chest tightness, shortness of breath and wheezing.    Cardiovascular: Negative.  Negative for chest pain and palpitations.   Gastrointestinal: Negative.  Negative for abdominal pain, diarrhea, nausea and vomiting.   Genitourinary: Negative for dysuria, frequency, hematuria and urgency.   Musculoskeletal: Negative.  Negative for myalgias.   Skin: Positive for rash.   Allergic/Immunologic: Negative.  Negative for immunocompromised state.   Neurological: Negative.  Negative for headaches.        Family History   Family History   Problem Relation Age of Onset     Heart Disease Maternal Grandfather      Heart Disease Paternal Grandfather      Nephrolithiasis Sister      C.A.D. No family hx of      Diabetes No family hx of      Hypertension No family hx of      Cancer No family hx of       Cerebrovascular Disease No family hx of      Thyroid Disease No family hx of      Glaucoma No family hx of      Macular Degeneration No family hx of        Social History  Social History     Socioeconomic History     Marital status:      Spouse name: Not on file     Number of children: Not on file     Years of education: Not on file     Highest education level: Not on file   Occupational History     Not on file   Tobacco Use     Smoking status: Never Smoker     Smokeless tobacco: Never Used   Substance and Sexual Activity     Alcohol use: Yes     Comment: occasionally     Drug use: No     Sexual activity: Yes     Partners: Female     Birth control/protection: Condom   Other Topics Concern     Parent/sibling w/ CABG, MI or angioplasty before 65F 55M? Yes   Social History Narrative     Not on file     Social Determinants of Health     Financial Resource Strain:      Difficulty of Paying Living Expenses:    Food Insecurity:      Worried About Running Out of Food in the Last Year:      Ran Out of Food in the Last Year:    Transportation Needs:      Lack of Transportation (Medical):      Lack of Transportation (Non-Medical):    Physical Activity:      Days of Exercise per Week:      Minutes of Exercise per Session:    Stress:      Feeling of Stress :    Social Connections:      Frequency of Communication with Friends and Family:      Frequency of Social Gatherings with Friends and Family:      Attends Yarsanism Services:      Active Member of Clubs or Organizations:      Attends Club or Organization Meetings:      Marital Status:    Intimate Partner Violence:      Fear of Current or Ex-Partner:      Emotionally Abused:      Physically Abused:      Sexually Abused:         Surgical History:  Past Surgical History:   Procedure Laterality Date     FACIAL RECONSTRUCTION SURGERY      multiple surgeries following a trauma        Problem List:  Patient Active Problem List   Diagnosis     CARDIOVASCULAR SCREENING; LDL  GOAL LESS THAN 160     Sebaceous cyst     History of kidney stones     Overweight (BMI 25.0-29.9)     Motion sickness, initial encounter           OBJECTIVE:     Vital signs noted and reviewed by Dillon Driver PA-C  There were no vitals taken for this visit.     PEFR:    Physical Exam  Vitals and nursing note reviewed.   Constitutional:       General: He is not in acute distress.     Appearance: He is well-developed. He is not ill-appearing, toxic-appearing or diaphoretic.   HENT:      Head: Normocephalic and atraumatic.      Right Ear: Hearing, tympanic membrane, ear canal and external ear normal. Tympanic membrane is not perforated, erythematous, retracted or bulging.      Left Ear: Hearing, tympanic membrane, ear canal and external ear normal. Tympanic membrane is not perforated, erythematous, retracted or bulging.      Nose: Nose normal. No mucosal edema, congestion or rhinorrhea.      Mouth/Throat:      Pharynx: No oropharyngeal exudate or posterior oropharyngeal erythema.      Tonsils: No tonsillar exudate or tonsillar abscesses. 0 on the right. 0 on the left.   Eyes:      Pupils: Pupils are equal, round, and reactive to light.   Cardiovascular:      Rate and Rhythm: Normal rate and regular rhythm.      Heart sounds: Normal heart sounds, S1 normal and S2 normal. Heart sounds not distant. No murmur heard.   No friction rub. No gallop.    Pulmonary:      Effort: Pulmonary effort is normal. No respiratory distress.      Breath sounds: Normal breath sounds. No decreased breath sounds, wheezing, rhonchi or rales.   Abdominal:      General: Bowel sounds are normal. There is no distension.      Palpations: Abdomen is soft.      Tenderness: There is no abdominal tenderness.   Musculoskeletal:      Cervical back: Normal range of motion and neck supple.   Lymphadenopathy:      Cervical: No cervical adenopathy.   Skin:     General: Skin is warm and dry.      Findings: No rash.   Neurological:      Mental Status: He is  alert.      Cranial Nerves: No cranial nerve deficit.   Psychiatric:         Attention and Perception: He is attentive.         Speech: Speech normal.         Behavior: Behavior normal. Behavior is cooperative.         Thought Content: Thought content normal.         Judgment: Judgment normal.             Dillon Driver PA-C  8/8/2021, 1:54 PM

## 2021-08-08 NOTE — PROGRESS NOTES
Chief Complaint:     Chief Complaint   Patient presents with     Laceration     L forehead laceration from transmission avinash.          Assessment:     1. Laceration of forehead, initial encounter    2. Injury of head, initial encounter         Plan:    Imaging of the injured area for foreign body or fracture was not  Indicated    Wound closed per procedure note below.    Patient given tetanus booster in clinic today.  Neuro exam was benign.  Low suspicion for concussion, skull fracture or intracranial bleed.    Wound was cleaned with sterile saline and surgiscrub.  Antibiotic ointment and sterile dressing applied in clinic.     Discussed home wound care. Return to  with increased swelling, pain, redness, pus or fevers.  Follow up for Suture removal in 7 days.       Patient was discharged in stable condition.  Patient verbalized understanding and agreed with this plan.      Procedure Note - Wound Repair:  Procedure performed by Christopher Driver.     Anesthesia was not used.  The wound, located on the L forehead, measured 1 cm and was no foreign bodies, ragged edges.  The level of complexity was: simple .  The neurovascular exam was normal.  It was cleaned with surgiscrub, irrigated with normal saline and explored.  The wound was closed using 6-0 Ethylon interrupted.      SUBJECTIVE     HPI: Catarino Leon is an 35 year old male that presents to clinic after cutting self on the forehead when he hit his head on a floor avinash.  He did not lose consciousness.  He denies any headache, dizziness, or nausea.  He denies numbness of the forehead.  Patient is not up to date on tetanus.     Review of Systems:  Review of Systems   Constitutional: Negative.  Negative for chills and fever.   HENT: Negative.  Negative for sore throat.    Respiratory: Negative.  Negative for cough, chest tightness, shortness of breath and wheezing.    Cardiovascular: Negative.  Negative for chest pain and palpitations.   Gastrointestinal: Negative.   Negative for abdominal pain, diarrhea, nausea and vomiting.   Genitourinary: Negative for dysuria, frequency, hematuria and urgency.   Musculoskeletal: Negative.  Negative for myalgias.   Skin: Positive for wound. Negative for rash.   Allergic/Immunologic: Negative.  Negative for immunocompromised state.   Neurological: Negative.  Negative for headaches.         Family History   Family History   Problem Relation Age of Onset     Heart Disease Maternal Grandfather      Heart Disease Paternal Grandfather      Nephrolithiasis Sister      C.A.D. No family hx of      Diabetes No family hx of      Hypertension No family hx of      Cancer No family hx of      Cerebrovascular Disease No family hx of      Thyroid Disease No family hx of      Glaucoma No family hx of      Macular Degeneration No family hx of         Problem history  Patient Active Problem List   Diagnosis     CARDIOVASCULAR SCREENING; LDL GOAL LESS THAN 160     Sebaceous cyst     History of kidney stones     Overweight (BMI 25.0-29.9)     Motion sickness, initial encounter        Allergies  No Known Allergies     Social History  Social History     Socioeconomic History     Marital status:      Spouse name: Not on file     Number of children: Not on file     Years of education: Not on file     Highest education level: Not on file   Occupational History     Not on file   Tobacco Use     Smoking status: Never Smoker     Smokeless tobacco: Never Used   Substance and Sexual Activity     Alcohol use: Yes     Comment: occasionally     Drug use: No     Sexual activity: Yes     Partners: Female     Birth control/protection: Condom   Other Topics Concern     Parent/sibling w/ CABG, MI or angioplasty before 65F 55M? Yes   Social History Narrative     Not on file     Social Determinants of Health     Financial Resource Strain:      Difficulty of Paying Living Expenses:    Food Insecurity:      Worried About Running Out of Food in the Last Year:      Ran Out of  Food in the Last Year:    Transportation Needs:      Lack of Transportation (Medical):      Lack of Transportation (Non-Medical):    Physical Activity:      Days of Exercise per Week:      Minutes of Exercise per Session:    Stress:      Feeling of Stress :    Social Connections:      Frequency of Communication with Friends and Family:      Frequency of Social Gatherings with Friends and Family:      Attends Yazdanism Services:      Active Member of Clubs or Organizations:      Attends Club or Organization Meetings:      Marital Status:    Intimate Partner Violence:      Fear of Current or Ex-Partner:      Emotionally Abused:      Physically Abused:      Sexually Abused:         Current Meds    Current Outpatient Medications:      scopolamine (TRANSDERM) 1 MG/3DAYS 72 hr patch, Place 1 patch onto the skin every 72 hours, Disp: 3 patch, Rfl: 0     OBJECTIVE    Vitals reviewed by Dillon Driver PA-C  /77   Pulse 64   Temp 98.5  F (36.9  C)   Resp 14   SpO2 98%     Physical Exam:    Physical Exam  Vitals and nursing note reviewed.   Constitutional:       General: He is not in acute distress.     Appearance: He is well-developed. He is not ill-appearing, toxic-appearing or diaphoretic.   HENT:      Head: Normocephalic and atraumatic.        Comments: Roughly 1 cm laceration to the L forehead subcutaneus in nature.  Ragged wound edges with no contamination.     Right Ear: Hearing, tympanic membrane, ear canal and external ear normal. Tympanic membrane is not perforated, erythematous, retracted or bulging.      Left Ear: Hearing, tympanic membrane, ear canal and external ear normal. Tympanic membrane is not perforated, erythematous, retracted or bulging.      Nose: Nose normal. No mucosal edema, congestion or rhinorrhea.      Mouth/Throat:      Pharynx: No oropharyngeal exudate or posterior oropharyngeal erythema.      Tonsils: No tonsillar exudate or tonsillar abscesses. 0 on the right. 0 on the left.   Eyes:       Pupils: Pupils are equal, round, and reactive to light.   Cardiovascular:      Rate and Rhythm: Normal rate and regular rhythm.      Heart sounds: Normal heart sounds, S1 normal and S2 normal. Heart sounds not distant. No murmur heard.   No friction rub. No gallop.    Pulmonary:      Effort: Pulmonary effort is normal. No respiratory distress.      Breath sounds: Normal breath sounds. No decreased breath sounds, wheezing, rhonchi or rales.   Abdominal:      General: Bowel sounds are normal. There is no distension.      Palpations: Abdomen is soft.      Tenderness: There is no abdominal tenderness.   Musculoskeletal:      Cervical back: Normal range of motion and neck supple.   Lymphadenopathy:      Cervical: No cervical adenopathy.   Skin:     General: Skin is warm and dry.      Findings: No rash.   Neurological:      Mental Status: He is alert.      Cranial Nerves: No cranial nerve deficit or facial asymmetry.      Sensory: Sensation is intact. No sensory deficit.      Motor: Motor function is intact. No weakness, atrophy or abnormal muscle tone.      Coordination: Coordination is intact. Coordination normal.      Gait: Gait is intact. Gait normal.   Psychiatric:         Attention and Perception: He is attentive.         Speech: Speech normal.         Behavior: Behavior normal. Behavior is cooperative.         Thought Content: Thought content normal.         Judgment: Judgment normal.         Dillon Driver PA-C 1:59 PM

## 2024-05-15 NOTE — MR AVS SNAPSHOT
After Visit Summary   9/13/2017    Catarino Leon    MRN: [id number]           Patient Information     Date Of Birth          1985        Visit Information        Provider Department      9/13/2017 8:40 AM Merly Bridges APRN CNP [facility]        Today's Diagnoses     Acute maxillary sinusitis, recurrence not specified    -  1    Influenza vaccination declined by patient          Care Instructions    Based on your medical history and these are the current health maintenance or preventive care services that you are due for (some may have been done at this visit)  Health Maintenance Due   Topic Date Due     EYE EXAM Q1 YEAR  04/28/2017     INFLUENZA VACCINE (SYSTEM ASSIGNED)  09/01/2017         At [facility], we strive to deliver an exceptional experience to you, every time we see you.    If you receive a survey in the mail, please send us back your thoughts. We really do value your feedback.    Your care team's suggested websites for health information:  Www.Thwapr.[employer] : Up to date and easily searchable information on multiple topics.  Www.medlineplus.gov : medication info, interactive tutorials, watch real surgeries online  Www.familydoctor.org : good info from the Academy of Family Physicians  Www.cdc.gov : public health info, travel advisories, epidemics (H1N1)  Www.aap.org : children's health info, normal development, vaccinations  Www.health.[facility].[place].us : [place] dept of health, public health issues in [place], N1N1    How to contact your care team:   Team [facility] [phone]         Pharmacy [phone]    Dr. Luna, Mallika Vásquez PA-C, Dorothy Mariscal CNP, Catherine Kaba PA-C, Dr. Valerio, and GEORGE Batista CNP    Team RNs: Suzy & Fiorella      Clinic hours  M-Th 7 am-7 pm   Fri 7 am-5 pm.   Urgent care M-F 11 am-9 pm,   Sat/Sun 9 am-5 pm.  Pharmacy M-Th 8 am-8 pm Fri 8 am-6 pm  Sat/Sun 9 am-5 pm.     All  Surgery password changes, disabled accounts, or ID changes in GoMore/MyHealth will be done by our Access Services Department.    If you need help with your account or password, call: [phone]. Clinic staff no longer has the ability to change passwords.     Sinusitis (No Antibiotics)    The sinuses are air-filled spaces within the bones of the face. They connect to the inside of the nose. Sinusitis is an inflammation of the tissue lining the sinus cavity. Sinus inflammation can occur during a cold. It can also be due to allergies to pollens and other particles in the air. It can cause symptoms such as sinus congestion, headache, sore throat, facial swelling and fullness. It may also cause a low-grade fever. No infection is present, and no antibiotic treatment is needed.  Home care    Drink plenty of water, hot tea, and other liquids. This may help thin mucus. It also may promote sinus drainage.    Heat may help soothe painful areas of the face. Use a towel soaked in hot water. Or,  the shower and direct the hot spray onto your face. Using a vaporizer along with a menthol rub at night may also help.     An expectorant containing guaifenesin may help thin the mucus and promote drainage from the sinuses.    Over-the-counter decongestants may be used unless a similar medicine was prescribed. Nasal sprays work the fastest. Use one that contains phenylephrine or oxymetazoline. First blow the nose gently. Then use the spray. Do not use these medicines more often than directed on the label or symptoms may get worse. You may also use tablets containing pseudoephedrine. Avoid products that combine ingredients, because side effects may be increased. Read labels. You can also ask the pharmacist for help. (NOTE: Persons with high blood pressure should not use decongestants. They can raise blood pressure.)    Over-the-counter antihistamines may help if allergies contributed to your sinusitis.      Use acetaminophen or  ibuprofen to control pain, unless another pain medicine was prescribed. (If you have chronic liver or kidney disease or ever had a stomach ulcer, talk with your doctor before using these medicines. Aspirin should never be used in anyone under 18 years of age who is ill with a fever. It may cause severe liver damage.)    Use nasal rinses or irrigation as instructed by your health care provider.    Don't smoke. This can worsen symptoms.  Follow-up care  Follow up with your healthcare provider or our staff if you are not improving within the next week.  When to seek medical advice  Call your healthcare provider if any of these occur:    Green or yellow discharge from the nose or into the throat    Facial pain or headache becoming more severe    Stiff neck    Unusual drowsiness or confusion    Swelling of the forehead or eyelids    Vision problems, including blurred or double vision    Fever of 100.4 F (38 C) or higher, or as directed by your healthcare provider    Seizure    Breathing problems    Symptoms not resolving within 10 days  Date Last Reviewed: 4/13/2015 2000-2017 [employer]. [address] [place] [address]. All rights reserved. This information is not intended as a substitute for professional medical care. Always follow your healthcare professional's instructions.                Follow-ups after your visit        Who to contact     If you have questions or need follow up information about today's clinic visit or your schedule please contact [facility] directly at [phone].  Normal or non-critical lab and imaging results will be communicated to you by MyChart, letter or phone within 4 business days after the clinic has received the results. If you do not hear from us within 7 days, please contact the clinic through MyChart or phone. If you have a critical or abnormal lab result, we will notify you by phone as soon as possible.  Submit refill requests through  "Sarah[employer]art or call your pharmacy and they will forward the refill request to us. Please allow 3 business days for your refill to be completed.          Additional Information About Your Visit        MyChart Information     [employer] lets you send messages to your doctor, view your test results, renew your prescriptions, schedule appointments and more. To sign up, go to [email or web address] . Click on \"Log in\" on the left side of the screen, which will take you to the Welcome page. Then click on \"Sign up Now\" on the right side of the page.     You will be asked to enter the access code listed below, as well as some personal information. Please follow the directions to create your username and password.     Your access code is: [email or web address]  Expires: 2017  9:09 AM     Your access code will  in 90 days. If you need help or a new code, please call your [facility] clinic or [phone].        Care EveryWhere ID     This is your Care EveryWhere ID. This could be used by other organizations to access your [facility] medical records  [id number]        Your Vitals Were     Pulse Temperature Height Pulse Oximetry BMI (Body Mass Index)       74 98.1  F (36.7  C) (Oral) 5' 11.65\" (1.82 m) 97% 25.99 kg/m2        Blood Pressure from Last 3 Encounters:   17 114/74   10/10/16 114/67   16 125/66    Weight from Last 3 Encounters:   17 189 lb 12.8 oz (86.1 kg)   10/10/16 191 lb (86.6 kg)   06/09/15 178 lb (80.7 kg)              Today, you had the following     No orders found for display         Today's Medication Changes          These changes are accurate as of: 17  9:09 AM.  If you have any questions, ask your nurse or doctor.               Start taking these medicines.        Dose/Directions    benzonatate 200 MG capsule   Commonly known as:  TESSALON   Used for:  Acute maxillary sinusitis, recurrence not specified   Started by:  Merly Bridges APRN CNP        Dose:  200 mg   Take 1 capsule " (200 mg) by mouth 3 times daily as needed for cough   Quantity:  21 capsule   Refills:  0            Where to get your medicines      These medications were sent to [facility] - [place], [place] - [address]  [address], [place] [address]     Phone:  [phone]     benzonatate 200 MG capsule                Primary Care Provider Office Phone #    [facility] [phone]       No address on file        Equal Access to Services     SULEMAN DEWITT : Hadii aad ku hadasho Soomaali, waaxda luqadaha, qaybta kaalmada adeegyada, waxay idiin hayaan adeeg kharash laanisa alfonso. [facility] [phone].    ATENCIÓN: Si habla español, tiene a castillo disposición servicios gratuitos de asistencia lingüística. [facility] [phone].    We comply with applicable federal civil rights laws and [place] laws. We do not discriminate on the basis of race, color, national origin, age, disability sex, sexual orientation or gender identity.            Thank you!     Thank you for choosing [facility]  for your care. Our goal is always to provide you with excellent care. Hearing back from our patients is one way we can continue to improve our services. Please take a few minutes to complete the written survey that you may receive in the mail after your visit with us. Thank you!             Your Updated Medication List - Protect others around you: Learn how to safely use, store and throw away your medicines at www.disposemymeds.org.          This list is accurate as of: 9/13/17  9:09 AM.  Always use your most recent med list.                   Brand Name Dispense Instructions for use Diagnosis    benzonatate 200 MG capsule    TESSALON    21 capsule    Take 1 capsule (200 mg) by mouth 3 times daily as needed for cough    Acute maxillary sinusitis, recurrence not specified       VYVANSE 30 MG capsule   Generic drug:  lisdexamfetamine      Take 30 mg by mouth every morning           Infectious Disease Orthopedics Orthoped[facility]